# Patient Record
Sex: FEMALE | Race: WHITE | Employment: UNEMPLOYED | URBAN - METROPOLITAN AREA
[De-identification: names, ages, dates, MRNs, and addresses within clinical notes are randomized per-mention and may not be internally consistent; named-entity substitution may affect disease eponyms.]

---

## 2021-04-20 ENCOUNTER — TRANSCRIBE ORDER (OUTPATIENT)
Dept: SCHEDULING | Age: 61
End: 2021-04-20

## 2021-04-20 ENCOUNTER — HOSPITAL ENCOUNTER (OUTPATIENT)
Dept: CT IMAGING | Age: 61
Discharge: HOME | End: 2021-04-20
Attending: ORTHOPAEDIC SURGERY
Payer: COMMERCIAL

## 2021-04-20 DIAGNOSIS — S93.325A DISLOCATION OF TARSOMETATARSAL JOINT OF LEFT FOOT: Primary | ICD-10-CM

## 2021-04-20 DIAGNOSIS — S93.325A DISLOCATION OF TARSOMETATARSAL JOINT OF LEFT FOOT: ICD-10-CM

## 2021-04-20 PROCEDURE — 73700 CT LOWER EXTREMITY W/O DYE: CPT

## 2021-11-08 PROBLEM — Z09 SURGICAL FOLLOW-UP CARE: Status: ACTIVE | Noted: 2021-11-08

## 2021-11-08 PROBLEM — S93.325A LISFRANC'S DISLOCATION, LEFT, INITIAL ENCOUNTER: Status: ACTIVE | Noted: 2021-11-08

## 2021-11-08 PROBLEM — S92.322A DISPLACED FRACTURE OF SECOND METATARSAL BONE, LEFT FOOT, INITIAL ENCOUNTER FOR CLOSED FRACTURE: Status: ACTIVE | Noted: 2021-11-08

## 2021-11-09 ENCOUNTER — OFFICE VISIT (OUTPATIENT)
Dept: ORTHOPEDIC SURGERY | Age: 61
End: 2021-11-09
Payer: COMMERCIAL

## 2021-11-09 DIAGNOSIS — S93.325A LISFRANC'S DISLOCATION, LEFT, INITIAL ENCOUNTER: Primary | ICD-10-CM

## 2021-11-09 DIAGNOSIS — Z09 SURGICAL FOLLOW-UP CARE: ICD-10-CM

## 2021-11-09 DIAGNOSIS — S92.322A DISPLACED FRACTURE OF SECOND METATARSAL BONE, LEFT FOOT, INITIAL ENCOUNTER FOR CLOSED FRACTURE: ICD-10-CM

## 2021-11-09 PROCEDURE — 29405 APPL SHORT LEG CAST: CPT | Performed by: ORTHOPAEDIC SURGERY

## 2021-11-09 PROCEDURE — 99024 POSTOP FOLLOW-UP VISIT: CPT | Performed by: ORTHOPAEDIC SURGERY

## 2021-11-09 NOTE — PATIENT INSTRUCTIONS
Wearing a Fiberglass Cast: Care Instructions  Your Care Instructions     A cast protects a broken bone or other injury while it heals. Most casts are made of fiberglass. After a cast is put on, you can't remove it yourself. Your doctor or a technician will take it off. Follow-up care is a key part of your treatment and safety. Be sure to make and go to all appointments, and call your doctor if you are having problems. It's also a good idea to know your test results and keep a list of the medicines you take. How can you care for yourself at home? General care  · Follow your doctor's instructions for when you can start using the limb that has the cast. Fiberglass casts dry quickly and are soon hard enough to protect the injured arm or leg. · When it's okay to put weight on your leg or foot cast, don't stand or walk on it unless it's designed for walking. · Prop up the injured arm or leg on a pillow anytime you sit or lie down during the first 3 days. Try to keep it above the level of your heart. This will help reduce swelling. · Put ice or a cold pack on your cast for 10 to 20 minutes at a time. Try to do this every 1 to 2 hours for the next 3 days (when you are awake). Put a thin cloth between the ice and your cast. Keep the cast dry. · Be safe with medicines. Read and follow all instructions on the label. ? If the doctor gave you a prescription medicine for pain, take it as prescribed. ? If you are not taking a prescription pain medicine, ask your doctor if you can take an over-the-counter medicine. · Do exercises as instructed by your doctor or physical therapist. These exercises will help keep your muscles strong and your joints flexible while you heal.  · Wiggle your fingers or toes on the injured arm or leg often. This helps reduce swelling and stiffness. Water and your cast  · Try to keep your cast as dry as you can. The fiberglass part of your cast can get wet.  But getting the inside wet can cause problems. · Use a bag or tape a sheet of plastic to cover your cast when you take a shower or bath or when you have any other contact with water. (Don't take a bath unless you can keep the cast out of the water.) Moisture can collect under the cast and cause skin irritation and itching. It can make infection more likely if you have had surgery or have a wound under the cast.  · If you have a water-resistant cast, ask your doctor how often it can get wet and how to take care of it. Cast and skin care  · Try blowing cool air from a hair dryer or fan into the cast to help relieve itching. Never stick items under your cast to scratch the skin. · Don't use oils or lotions near your cast. If the skin gets red or irritated around the edge of the cast, you may pad the edges with a soft material or use tape to cover them. When should you call for help? Call your doctor now or seek immediate medical care if:    · You have increased or severe pain.     · You feel a warm or painful spot under the cast.     · You have problems with your cast. For example:  ? The skin under the cast burns or stings. ? The cast feels too tight or too loose. ? There is a lot of swelling near the cast. (Some swelling is normal.)  ? You have a new fever. ? There is drainage or a bad smell coming from the cast.     · Your foot or hand is cool or pale or changes color.     · You have trouble moving your fingers or toes.     · You have symptoms of a blood clot in your arm or leg (called a deep vein thrombosis). These may include:  ? Pain in the arm, calf, back of the knee, thigh, or groin. ? Redness and swelling in the arm, leg, or groin. Watch closely for changes in your health, and be sure to contact your doctor if:    · The cast is breaking apart.     · You are not getting better as expected. Where can you learn more?   Go to http://www.gray.com/  Enter O925 in the search box to learn more about \"Wearing a Fiberglass Cast: Care Instructions. \"  Current as of: July 1, 2021               Content Version: 13.0  © 4465-2834 [employer]. Care instructions adapted under license by [employer] (which disclaims liability or warranty for this information). If you have questions about a medical condition or this instruction, always ask your healthcare professional. [place] [address] any warranty or liability for your use of this information.

## 2021-11-09 NOTE — PROGRESS NOTES
Chief Complaint   Patient presents with    Surgical Follow-up     SX on 25. Oct.2021 - left foot tarsometatarsaljoint dislocation         SUBJECTIVE/OBJECTIVE:  Bridget Rebolledo (: 1960) is a 57 y.o. female, patient,here for evaluation of the following chief complaint(s): Patient is status post procedure on 2021 which was arthrodesis of Lisfranc joint involving the first, second tarsometatarsal joint and the cuneiforms medial and middle cuneiform arthrodesis. Patient is doing well she has no complaints. She was in a splint. Denies chest pain, shortness of breath, calf pain or swelling. Physical Exam  Pleasant well-nourished female , alert and oriented to person, time and place, no acute distress. Patient is nonweightbearing using assistive devices. Left ankle: Full active and passive range of motion, nontender, no swelling. Left foot: Incision site looks good, healing well, there is a small area of eschar still present but the incision looks healed. Neurovascular intact light touch sensation capillary refill is brisk. Normal tenderness to palpation at midfoot where surgery done. Position of foot looks excellent. Contralateral foot and ankle exam, nontender, no swelling ligaments grossly stable. Normal weightbearing stance. Neurovascular exam intact for light touch sensation, cap refill, dorsalis pedis pulse palpable, flexion/extension strength 5/5. Skin intact, incision healing. No skin discolorations, no warmth to skin. Imaging:  No x-rays obtained today. ASSESSMENT/PLAN:  Below is the assessment and plan developed based on review of pertinent history, physical exam, labs, studies, and medications. 1. Lisfranc's dislocation, left, initial encounter  2. Displaced fracture of second metatarsal bone, left foot, initial encounter for closed fracture  3. Surgical follow-up care    She is here for follow-up at 2 weeks, sutures removed, Steri-Strips are applied. She is placing on in a nonweightbearing short leg cast today. She is to follow-up in approximately 4 weeks at which time we will take her out of cast and obtain three views nonweightbearing left foot x-rays. Patient has no pain 2 weeks postop. No Known Allergies    No current outpatient medications on file. No current facility-administered medications for this visit. No past medical history on file. Past Surgical History:   Procedure Laterality Date    HX  SECTION      x 2    HX KNEE ARTHROSCOPY Bilateral     HX KNEE REPLACEMENT Bilateral        Family History   Problem Relation Age of Onset    Cancer Mother     High Cholesterol Father     Arthritis-osteo Sister        Social History     Socioeconomic History    Marital status:      Spouse name: Not on file    Number of children: Not on file    Years of education: Not on file    Highest education level: Not on file   Occupational History    Not on file   Tobacco Use    Smoking status: Never Smoker    Smokeless tobacco: Never Used   Substance and Sexual Activity    Alcohol use: Not on file     Comment: 1-2 drinks per [employer] Drug use: Never    Sexual activity: Not on file   Other Topics Concern    Not on file   Social History Narrative    Not on file     Social Determinants of Health     Financial Resource Strain:     Difficulty of Paying Living Expenses: Not on file   Food Insecurity:     Worried About Running Out of Food in the Last Year: Not on file    Adiel of Food in the Last Year: Not on file   Transportation Needs:     Lack of Transportation (Medical): Not on file    Lack of Transportation (Non-Medical):  Not on file   Physical Activity:     Days of Exercise per Week: Not on file    Minutes of Exercise per Session: Not on file   Stress:     Feeling of Stress : Not on file   Social Connections:     Frequency of Communication with Friends and Family: Not on file    Frequency of Social Gatherings with Friends and Family: Not on file    Attends Caodaism Services: Not on file    Active Member of Clubs or Organizations: Not on file    Attends Club or Organization Meetings: Not on file    Marital Status: Not on file   Intimate Partner Violence:     Fear of Current or Ex-Partner: Not on file    Emotionally Abused: Not on file    Physically Abused: Not on file    Sexually Abused: Not on file   Housing Stability:     Unable to Pay for Housing in the Last Year: Not on file    Number of [place] in the Last Year: Not on file    Unstable Housing in the Last Year: Not on file       Review of Systems    No flowsheet data found. Vitals: There were no vitals taken for this visit. There is no height or weight on file to calculate BMI. An electronic signature was used to authenticate this note.   -- Lakisha Maurer MD

## 2021-12-07 ENCOUNTER — OFFICE VISIT (OUTPATIENT)
Dept: ORTHOPEDIC SURGERY | Age: 61
End: 2021-12-07
Payer: COMMERCIAL

## 2021-12-07 DIAGNOSIS — S93.325A LISFRANC'S DISLOCATION, LEFT, INITIAL ENCOUNTER: ICD-10-CM

## 2021-12-07 DIAGNOSIS — S92.322A DISPLACED FRACTURE OF SECOND METATARSAL BONE, LEFT FOOT, INITIAL ENCOUNTER FOR CLOSED FRACTURE: ICD-10-CM

## 2021-12-07 DIAGNOSIS — Z09 SURGICAL FOLLOW-UP CARE: Primary | ICD-10-CM

## 2021-12-07 PROCEDURE — 29515 APPLICATION SHORT LEG SPLINT: CPT | Performed by: ORTHOPAEDIC SURGERY

## 2021-12-07 PROCEDURE — 99024 POSTOP FOLLOW-UP VISIT: CPT | Performed by: ORTHOPAEDIC SURGERY

## 2021-12-07 NOTE — PROGRESS NOTES
Claribel Morrissey (: 1960) is a 57 y.o. female, patient,here for evaluation of the following No chief complaint on file. ASSESSMENT/PLAN:  Below is the assessment and plan developed based on review of pertinent history, physical exam, labs, studies, and medications. 1. Surgical follow-up care  -     CAST SUP SHT LEG SPLNT FBRGL  -     DC APPLY LOWER LEG SPLINT  2. Displaced fracture of second metatarsal bone, left foot, initial encounter for closed fracture  -     XR FOOT RT MIN 3 V; Future  -     CAST SUP SHT LEG SPLNT FBRGL  -     DC APPLY LOWER LEG SPLINT  3. Lisfranc's dislocation, left, initial encounter  -     XR FOOT RT MIN 3 V; Future  -     CAST SUP SHT LEG SPLNT FBRGL  -     DC APPLY LOWER LEG SPLINT    Patient is doing okay, we removed the cast today and place her into a splint, she did have a boot but would prefer to splint over the boot for now since she remains nonweightbearing. Next time she returns, we will get new x-rays of the left foot 3 views nonweightbearing and transition to a boot. Pending the foot x-rays repeated, she may be able to start weightbearing as tolerated in the boot. We will also consider referring to physical therapy at that time. Return in about 3 weeks (around 2021). No Known Allergies    No current outpatient medications on file. No current facility-administered medications for this visit. No past medical history on file.     Past Surgical History:   Procedure Laterality Date    HX  SECTION      x 2    HX KNEE ARTHROSCOPY Bilateral     HX KNEE REPLACEMENT Bilateral        Family History   Problem Relation Age of Onset    Cancer Mother     High Cholesterol Father     Arthritis-osteo Sister        Social History     Socioeconomic History    Marital status:      Spouse name: Not on file    Number of children: Not on file    Years of education: Not on file    Highest education level: Not on file   Occupational History  Not on file   Tobacco Use    Smoking status: Never Smoker    Smokeless tobacco: Never Used   Substance and Sexual Activity    Alcohol use: Not on file     Comment: 1-2 drinks per ocasion    Drug use: Never    Sexual activity: Not on file   Other Topics Concern    Not on file   Social History Narrative    Not on file     Social Determinants of Health     Financial Resource Strain:     Difficulty of Paying Living Expenses: Not on file   Food Insecurity:     Worried About Running Out of Food in the Last Year: Not on file    Adiel of Food in the Last Year: Not on file   Transportation Needs:     Lack of Transportation (Medical): Not on file    Lack of Transportation (Non-Medical): Not on file   Physical Activity:     Days of Exercise per Week: Not on file    Minutes of Exercise per Session: Not on file   Stress:     Feeling of Stress : Not on file   Social Connections:     Frequency of Communication with Friends and Family: Not on file    Frequency of Social Gatherings with Friends and Family: Not on file    Attends Scientologist Services: Not on file    Active Member of [address] [place] [address] or Organizations: Not on file    Attends Club or Organization Meetings: Not on file    Marital Status: Not on file   Intimate Partner Violence:     Fear of Current or Ex-Partner: Not on file    Emotionally Abused: Not on file    Physically Abused: Not on file    Sexually Abused: Not on file   Housing Stability:     Unable to Pay for Housing in the Last Year: Not on file    Number of [place] in the Last Year: Not on file    Unstable Housing in the Last Year: Not on file           Vitals: There were no vitals taken for this visit. There is no height or weight on file to calculate BMI. SUBJECTIVE/OBJECTIVE:  Allen Fortune (: 1960)   Patient back status post surgical procedure on 10/25/2021 which was arthrodesis of left midfoot Lisfranc joint fracture dislocation.   Today she is doing well, she was in a short leg cast, she has no new complaints. Physical Exam  Pleasant well-nourished female , alert and oriented to person, time and place, no acute distress. Nonweightbearing gait, limited weightbearing stance. Left ankle: Proximally calf soft nontender, ankle is also nontender, skin intact, ligaments grossly stable. Left foot: Incision healing well, no signs of infection, foot position looks excellent, minimal swelling. Neurovascular exam intact for light touch sensation, capillary refill. Neurovascular exam intact for light touch sensation, cap refill, dorsalis pedis pulse palpable, flexion/extension strength 5/5. Imaging:    XR Results (most recent):  Results from Appointment encounter on 12/07/21    XR FOOT RT MIN 3 V    Narrative  The x-rays obtained were left foot AP, lateral and oblique nonweightbearing x-rays. (The x-rays were put in for right but the correct side is left foot). The Lisfranc joint position is good, arthrodesis is satisfactory and appears to be fusing well at the first TMT joint and the second TMT joint, implants are intact without loosening. The x-ray ordered states right, the correct foot is the left foot not the right. An electronic signature was used to authenticate this note.   -- Angelito Negrete MD

## 2021-12-28 ENCOUNTER — OFFICE VISIT (OUTPATIENT)
Dept: ORTHOPEDIC SURGERY | Age: 61
End: 2021-12-28
Payer: COMMERCIAL

## 2021-12-28 VITALS — WEIGHT: 155 LBS | BODY MASS INDEX: 26.46 KG/M2 | HEIGHT: 64 IN

## 2021-12-28 DIAGNOSIS — S93.325A LISFRANC'S DISLOCATION, LEFT, INITIAL ENCOUNTER: ICD-10-CM

## 2021-12-28 DIAGNOSIS — S92.322A DISPLACED FRACTURE OF SECOND METATARSAL BONE, LEFT FOOT, INITIAL ENCOUNTER FOR CLOSED FRACTURE: ICD-10-CM

## 2021-12-28 DIAGNOSIS — M79.672 LEFT FOOT PAIN: Primary | ICD-10-CM

## 2021-12-28 PROCEDURE — 99024 POSTOP FOLLOW-UP VISIT: CPT | Performed by: ORTHOPAEDIC SURGERY

## 2021-12-28 NOTE — PROGRESS NOTES
Bridget Rebolledo (: 1960) is a 57 y.o. female, patient,here for evaluation of the following   Chief Complaint   Patient presents with    Foot Pain     left foot s/p repair of lisfranc fracture         ASSESSMENT/PLAN:  Below is the assessment and plan developed based on review of pertinent history, physical exam, labs, studies, and medications. 1. Left foot pain  -     XR FOOT LT MIN 3 V; Future  2. Lisfranc's dislocation, left, initial encounter  -     XR FOOT LT MIN 3 V; Future  3. Displaced fracture of second metatarsal bone, left foot, initial encounter for closed fracture  -     XR FOOT LT MIN 3 V; Future      Patient is doing okay, she is near 3 months since date of surgery which was an arthrodesis of Lisfranc joint. She has been very compliant this time around. She is transition to boot will start weightbearing as tolerated in the boot but to remain mostly in the boot for weightbearing. I did inform to get a semirigid arch supports to use in the future. Next time she returns we will get new x-rays of left foot 3 views weightbearing. Return in about 4 weeks (around 2022) for repeat xrays. No Known Allergies    No current outpatient medications on file. No current facility-administered medications for this visit. No past medical history on file.     Past Surgical History:   Procedure Laterality Date    HX  SECTION      x 2    HX KNEE ARTHROSCOPY Bilateral     HX KNEE REPLACEMENT Bilateral        Family History   Problem Relation Age of Onset    Cancer Mother     High Cholesterol Father     OSTEOARTHRITIS Sister        Social History     Socioeconomic History    Marital status:      Spouse name: Not on file    Number of children: Not on file    Years of education: Not on file    Highest education level: Not on file   Occupational History    Not on file   Tobacco Use    Smoking status: Never Smoker    Smokeless tobacco: Never Used   Substance and Sexual Activity    Alcohol use: Not on file     Comment: 1-2 drinks per ocasion    Drug use: Never    Sexual activity: Not on file   Other Topics Concern    Not on file   Social History Narrative    Not on file     Social Determinants of Health     Financial Resource Strain:     Difficulty of Paying Living Expenses: Not on file   Food Insecurity:     Worried About Running Out of Food in the Last Year: Not on file    Adiel of Food in the Last Year: Not on file   Transportation Needs:     Lack of Transportation (Medical): Not on file    Lack of Transportation (Non-Medical): Not on file   Physical Activity:     Days of Exercise per Week: Not on file    Minutes of Exercise per Session: Not on file   Stress:     Feeling of Stress : Not on file   Social Connections:     Frequency of Communication with Friends and Family: Not on file    Frequency of Social Gatherings with Friends and Family: Not on file    Attends [employer] Services: Not on file    Active Member of [address] [place] [address] or Organizations: Not on file    Attends Club or Organization Meetings: Not on file    Marital Status: Not on file   Intimate Partner Violence:     Fear of Current or Ex-Partner: Not on file    Emotionally Abused: Not on file    Physically Abused: Not on file    Sexually Abused: Not on file   Housing Stability:     Unable to Pay for Housing in the Last Year: Not on file    Number of [place] in the Last Year: Not on file    Unstable Housing in the Last Year: Not on file           Vitals:  Ht 5' 4\" (1.626 m)   Wt 155 lb (70.3 kg)   BMI 26.61 kg/m²    Body mass index is 26.61 kg/m². SUBJECTIVE/OBJECTIVE:  Allen Tong (: 1960)   Patient back today for reevaluation of left foot, she had surgery around 2021 so near 3 months since date of surgery. She had an arthrodesis Lisfranc joint or Lisfranc joint injury that failed initial open reduction and internal fixation.   She is doing well she has no complaints. Physical Exam  Pleasant well-nourished female , alert and oriented to person, time and place, no acute distress. Nonweightbearing gait, limited weightbearing stance. Left ankle: Full active and passive range of motion intact, nontender, no swelling. Proximally the calves are soft nontender. Left foot: There is well-healed incisions neurovascular exam intact, foot position looks good, implants are not palpable. Able to flex and extend toes satisfactory motion and strength. Contralateral foot and ankle exam, nontender, no swelling ligaments grossly stable. Normal weightbearing stance. Neurovascular exam intact for light touch sensation, cap refill, dorsalis pedis pulse palpable, flexion/extension strength 5/5. Skin intact without open wounds, lesions or ulcers, no skin discolorations, normal warmth to skin. Imaging:    XR Results (most recent):  Results from Appointment encounter on 12/28/21    XR FOOT LT MIN 3 V    Narrative  Left foot AP, lateral and oblique nonweightbearing x-rays show good position to the foot, the fusion appears to be healing properly, implant intact and well aligned. Lisfranc joint looks normal.  Satisfactory bone density          An electronic signature was used to authenticate this note.   -- Taylor Anthony MD

## 2022-02-01 ENCOUNTER — OFFICE VISIT (OUTPATIENT)
Dept: ORTHOPEDIC SURGERY | Age: 62
End: 2022-02-01
Payer: COMMERCIAL

## 2022-02-01 DIAGNOSIS — S93.325D LISFRANC DISLOCATION, LEFT, SUBSEQUENT ENCOUNTER: Primary | ICD-10-CM

## 2022-02-01 DIAGNOSIS — Z98.890 HISTORY OF FOOT SURGERY: ICD-10-CM

## 2022-02-01 PROCEDURE — 99212 OFFICE O/P EST SF 10 MIN: CPT | Performed by: ORTHOPAEDIC SURGERY

## 2022-02-01 NOTE — LETTER
2/7/2022    Patient: Мария Rojas   YOB: 1960   Date of Visit: 2/1/2022     Vidya Tinsley MD  [address] [place] [id number]  Via Fax: [phone]    Dear Vidya Tinsley MD,      Thank you for referring Ms. Laverne Norton to [facility] for evaluation. My notes for this consultation are attached. If you have questions, please do not hesitate to call me. I look forward to following your patient along with you.       Sincerely,    Neelima Delgadillo MD

## 2022-02-01 NOTE — PROGRESS NOTES
Francia Hammer (: 1960) is a 57 y.o. female, patient,here for evaluation of the following   Chief Complaint   Patient presents with    Surgical Follow-up     left foot s/p repair of lisfranc fracture on 10/25/2021        ASSESSMENT/PLAN:  Below is the assessment and plan developed based on review of pertinent history, physical exam, labs, studies, and medications. 1. Lisfranc dislocation, left, subsequent encounter  -     XR STANDING FOOT LT MIN 3 V; Future  -     REFERRAL TO PHYSICAL THERAPY  2. History of foot surgery  -     REFERRAL TO PHYSICAL THERAPY      Patient doing very well, she is near 3 months since date of surgery which was a midfoot arthrodesis to address unstable Lisfranc joint injury. Last procedure was 2021. By x-ray and exam she appears healed and I did recommend physical therapy program to improve gait, strength, balance and also transitioning back to regular shoes. Recommend insoles and shoes to help protect. I will check her again in 6 months since date of surgery which is 3 months from now we will obtain x-rays of the left foot 3 views weightbearing compared to contralateral foot on AP view. She will return sooner if any problems arise. Return in about 3 months (around 2022) for repeat xrays. No Known Allergies    No current outpatient medications on file. No current facility-administered medications for this visit. No past medical history on file.     Past Surgical History:   Procedure Laterality Date    HX  SECTION      x 2    HX KNEE ARTHROSCOPY Bilateral     HX KNEE REPLACEMENT Bilateral        Family History   Problem Relation Age of Onset    Cancer Mother     High Cholesterol Father     OSTEOARTHRITIS Sister        Social History     Socioeconomic History    Marital status:      Spouse name: Not on file    Number of children: Not on file    Years of education: Not on file    Highest education level: Not on file   Occupational History    Not on file   Tobacco Use    Smoking status: Never Smoker    Smokeless tobacco: Never Used   Substance and Sexual Activity    Alcohol use: Not on file     Comment: 1-2 drinks per ocasion    Drug use: Never    Sexual activity: Not on file   Other Topics Concern    Not on file   Social History Narrative    Not on file     Social Determinants of Health     Financial Resource Strain:     Difficulty of Paying Living Expenses: Not on file   Food Insecurity:     Worried About Running Out of Food in the Last Year: Not on file    Adiel of Food in the Last Year: Not on file   Transportation Needs:     Lack of Transportation (Medical): Not on file    Lack of Transportation (Non-Medical): Not on file   Physical Activity:     Days of Exercise per Week: Not on file    Minutes of Exercise per Session: Not on file   Stress:     Feeling of Stress : Not on file   Social Connections:     Frequency of Communication with Friends and Family: Not on file    Frequency of Social Gatherings with Friends and Family: Not on file    Attends [employer] Services: Not on file    Active Member of [address] [place] [address] or Organizations: Not on file    Attends Club or Organization Meetings: Not on file    Marital Status: Not on file   Intimate Partner Violence:     Fear of Current or Ex-Partner: Not on file    Emotionally Abused: Not on file    Physically Abused: Not on file    Sexually Abused: Not on file   Housing Stability:     Unable to Pay for Housing in the Last Year: Not on file    Number of [place] in the Last Year: Not on file    Unstable Housing in the Last Year: Not on file           Vitals: There were no vitals taken for this visit. There is no height or weight on file to calculate BMI. SUBJECTIVE/OBJECTIVE:  Clara Diaz (: 1960)   Patient back for follow-up she had surgery in 2021 which is arthrodesis midfoot for Lisfranc joint injury which was unstable.  She is doing very well she has no complaints. He has however not gotten back into regular shoes and has been mostly in a boot. Physical Exam  Pleasant well-nourished female , alert and oriented to person, time and place, no acute distress. Mild antalgic gait, satisfactory weightbearing stance. Left ankle: Nontender, no swelling, motion intact. No ecchymosis, fluctuance or erythema. Left foot: Good position to the foot without malalignment or deformity, previous surgical incisions are well-healed. The planes are not prominent. Contralateral foot and ankle exam, nontender, no swelling ligaments grossly stable. Normal weightbearing stance. Neurovascular exam intact for light touch sensation, cap refill, dorsalis pedis pulse palpable, flexion/extension strength 5/5. Skin intact without open wounds, lesions or ulcers, no skin discolorations, normal warmth to skin. Imaging:     XR Results (most recent):  Results from Appointment encounter on 02/01/22    XR STANDING FOOT LT MIN 3 V    Narrative  Left foot AP, lateral and oblique standing x-rays show the Lisfranc joint arthrodesis is healed well, implants intact and looks stable, overall alignment of the foot is excellent. Normal bone density, no lesions. An electronic signature was used to authenticate this note.   -- Viki Caputo MD

## 2022-05-03 ENCOUNTER — OFFICE VISIT (OUTPATIENT)
Dept: ORTHOPEDIC SURGERY | Age: 62
End: 2022-05-03
Payer: COMMERCIAL

## 2022-05-03 DIAGNOSIS — S93.325D LISFRANC DISLOCATION, LEFT, SUBSEQUENT ENCOUNTER: ICD-10-CM

## 2022-05-03 DIAGNOSIS — Z98.1 STATUS POST ARTHRODESIS: Primary | ICD-10-CM

## 2022-05-03 PROCEDURE — 99212 OFFICE O/P EST SF 10 MIN: CPT | Performed by: ORTHOPAEDIC SURGERY

## 2022-05-03 NOTE — LETTER
5/3/2022    Patient: Elia Tripathi   YOB: 1960   Date of Visit: 5/3/2022     Norah Bell MD  [address] [place] [address]  Via Fax: [phone]    Dear Norah Bell MD,      Thank you for referring Ms. Marie Clark to [facility] for evaluation. My notes for this consultation are attached. If you have questions, please do not hesitate to call me. I look forward to following your patient along with you.       Sincerely,    Monika Santillan MD Due for labs fasting and has visit this month

## 2022-05-03 NOTE — PROGRESS NOTES
Clinton Garcia (: 1960) is a 57 y.o. female, patient,here for evaluation of the following   Chief Complaint   Patient presents with    Follow-up     left foot s/p repair of lisfranc fracture on 10/25/2021        ASSESSMENT/PLAN:  Below is the assessment and plan developed based on review of pertinent history, physical exam, labs, studies, and medications. 1. Status post arthrodesis  2. Lisfranc dislocation, left, subsequent encounter  -     XR STANDING FOOT LT MIN 3 V; Future    Patient is doing very well status post procedure in 2021. The arthrodesis is mending very well and the implants are intact and her foot anatomy is excellent. She can progress activities as tolerated and if any problems arise to return to see me. Informed that sometimes the implants may loosen and if that happens, it may become prominent and painful and may need to be removed. If it does happen, she will return to see me. Finally I did recommend appropriate shoe wear and insoles, I had previously provided information to this patient. She is going to start some lunging type of exercise which she could do as long as she is tolerating it well without pain or swelling to the foot. Patient is reminded that this can take almost a year to fully recover. If she does return in the future we will get new x-rays of the left foot 3 views weightbearing      Return if symptoms worsen or fail to improve. No Known Allergies    No current outpatient medications on file. No current facility-administered medications for this visit. No past medical history on file.     Past Surgical History:   Procedure Laterality Date    HX  SECTION      x 2    HX KNEE ARTHROSCOPY Bilateral     HX KNEE REPLACEMENT Bilateral        Family History   Problem Relation Age of Onset    Cancer Mother     High Cholesterol Father     OSTEOARTHRITIS Sister        Social History     Socioeconomic History    Marital status:  Spouse name: Not on file    Number of children: Not on file    Years of education: Not on file    Highest education level: Not on file   Occupational History    Not on file   Tobacco Use    Smoking status: Never Smoker    Smokeless tobacco: Never Used   Substance and Sexual Activity    Alcohol use: Not on file     Comment: 1-2 drinks per ocasion    Drug use: Never    Sexual activity: Not on file   Other Topics Concern    Not on file   Social History Narrative    Not on file     Social Determinants of Health     Financial Resource Strain:     Difficulty of Paying Living Expenses: Not on file   Food Insecurity:     Worried About Running Out of Food in the Last Year: Not on file    Adiel of Food in the Last Year: Not on file   Transportation Needs:     Lack of Transportation (Medical): Not on file    Lack of Transportation (Non-Medical): Not on file   Physical Activity:     Days of Exercise per Week: Not on file    Minutes of Exercise per Session: Not on file   Stress:     Feeling of Stress : Not on file   Social Connections:     Frequency of Communication with Friends and Family: Not on file    Frequency of Social Gatherings with Friends and Family: Not on file    Attends [employer] Services: Not on file    Active Member of [address] [place] [address] or Organizations: Not on file    Attends Club or Organization Meetings: Not on file    Marital Status: Not on file   Intimate Partner Violence:     Fear of Current or Ex-Partner: Not on file    Emotionally Abused: Not on file    Physically Abused: Not on file    Sexually Abused: Not on file   Housing Stability:     Unable to Pay for Housing in the Last Year: Not on file    Number of [place] in the Last Year: Not on file    Unstable Housing in the Last Year: Not on file           Vitals: There were no vitals taken for this visit. There is no height or weight on file to calculate BMI.             SUBJECTIVE/OBJECTIVE:  Nevaeh Tam (: 1960) Patient is back today for evaluation at near 6-month since date of last surgery on October 25, 2021 which was arthrodesis of the midfoot joints status post failed open reduction internal fixation of Lisfranc/tarsometatarsal joint dislocations. She is doing well today near 6 months since date of surgery. She has no complaints and would like to return to activities as tolerated. Physical Exam  Pleasant well-nourished female , alert and oriented to person, time and place, no acute distress. Mostly normal gait, satisfactory weightbearing stance. Right lower extremity/ankle: Calf soft nontender, ligaments stable, normal exam.    Right foot: Normal weightbearing stance, previous surgical incisions well-healed, no significant tenderness to palpation at midfoot joints where she had surgery, the foot is stable at midfoot, no malalignment or deformities present. Contralateral foot and ankle exam, nontender, no swelling ligaments grossly stable. Normal weightbearing stance. Neurovascular exam intact for light touch sensation, cap refill, dorsalis pedis pulse palpable, flexion/extension strength 5/5. Skin intact without open wounds, lesions or ulcers, no skin discolorations, normal warmth to skin. Imaging:    XR Results (most recent):  Results from Appointment encounter on 05/03/22    XR STANDING FOOT LT MIN 3 V    Narrative  Left foot AP, lateral and oblique standing x-rays show good alignment, there is retained implants at the midfoot and the joints appear to be healed, no loosening of implants. All looks excellent. An electronic signature was used to authenticate this note.   -- Becki Gillespie MD